# Patient Record
Sex: MALE | Race: WHITE | NOT HISPANIC OR LATINO
[De-identification: names, ages, dates, MRNs, and addresses within clinical notes are randomized per-mention and may not be internally consistent; named-entity substitution may affect disease eponyms.]

---

## 2021-03-14 ENCOUNTER — TRANSCRIPTION ENCOUNTER (OUTPATIENT)
Age: 31
End: 2021-03-14

## 2021-03-23 ENCOUNTER — APPOINTMENT (OUTPATIENT)
Dept: INTERNAL MEDICINE | Facility: CLINIC | Age: 31
End: 2021-03-23

## 2021-04-01 ENCOUNTER — NON-APPOINTMENT (OUTPATIENT)
Age: 31
End: 2021-04-01

## 2021-04-01 ENCOUNTER — APPOINTMENT (OUTPATIENT)
Dept: RHEUMATOLOGY | Facility: CLINIC | Age: 31
End: 2021-04-01
Payer: MEDICAID

## 2021-04-01 VITALS
HEIGHT: 74 IN | SYSTOLIC BLOOD PRESSURE: 116 MMHG | BODY MASS INDEX: 24.77 KG/M2 | DIASTOLIC BLOOD PRESSURE: 70 MMHG | WEIGHT: 193 LBS

## 2021-04-01 DIAGNOSIS — Z82.49 FAMILY HISTORY OF ISCHEMIC HEART DISEASE AND OTHER DISEASES OF THE CIRCULATORY SYSTEM: ICD-10-CM

## 2021-04-01 PROCEDURE — 99205 OFFICE O/P NEW HI 60 MIN: CPT

## 2021-04-01 PROCEDURE — 99072 ADDL SUPL MATRL&STAF TM PHE: CPT

## 2021-04-07 ENCOUNTER — MED ADMIN CHARGE (OUTPATIENT)
Age: 31
End: 2021-04-07

## 2021-04-07 DIAGNOSIS — Z11.1 ENCOUNTER FOR SCREENING FOR RESPIRATORY TUBERCULOSIS: ICD-10-CM

## 2021-04-08 LAB
A PHAGO GROEL BLD QL NAA+NON-PROBE: NEGATIVE
ALBUMIN SERPL ELPH-MCNC: 3.7 G/DL
ALP BLD-CCNC: 105 U/L
ALT SERPL-CCNC: 30 U/L
ANION GAP SERPL CALC-SCNC: 13 MMOL/L
ASO AB SER LA-ACNC: 374 IU/ML
AST SERPL-CCNC: 19 U/L
B BURGDOR AB SER-IMP: NEGATIVE
B BURGDOR DNA SPEC QL NAA+PROBE: NEGATIVE
B BURGDOR IGM PATRN SER IB-IMP: NEGATIVE
B BURGDOR18KD IGG SER QL IB: NORMAL
B BURGDOR23KD IGG SER QL IB: NORMAL
B BURGDOR23KD IGM SER QL IB: NORMAL
B BURGDOR28KD IGG SER QL IB: NORMAL
B BURGDOR30KD IGG SER QL IB: NORMAL
B BURGDOR31KD IGG SER QL IB: NORMAL
B BURGDOR39KD IGG SER QL IB: NORMAL
B BURGDOR39KD IGM SER QL IB: NORMAL
B BURGDOR41KD IGG SER QL IB: NORMAL
B BURGDOR41KD IGM SER QL IB: NORMAL
B BURGDOR45KD IGG SER QL IB: NORMAL
B BURGDOR58KD IGG SER QL IB: NORMAL
B BURGDOR66KD IGG SER QL IB: NORMAL
B BURGDOR93KD IGG SER QL IB: NORMAL
BACTERIA BLD CULT: NORMAL
BASOPHILS # BLD AUTO: 0.06 K/UL
BASOPHILS NFR BLD AUTO: 0.3 %
BILIRUB SERPL-MCNC: 0.4 MG/DL
BUN SERPL-MCNC: 9 MG/DL
CALCIUM SERPL-MCNC: 9 MG/DL
CHLORIDE SERPL-SCNC: 96 MMOL/L
CO2 SERPL-SCNC: 27 MMOL/L
COVID-19 SPIKE DOMAIN ANTIBODY INTERPRETATION: NEGATIVE
CREAT SERPL-MCNC: 0.78 MG/DL
CRP SERPL-MCNC: 129 MG/L
CRYOGLOB SERPL-MCNC: NEGATIVE
E CANIS+EWIN GROEL BLD QL NAA+NON-PROBE: NEGATIVE
E CHAFF GROEL BLD QL NAA+NON-PROBE: NEGATIVE
E MURIS EAUCL GROEL BLD QL NAA+NON-PRB: NEGATIVE
EOSINOPHIL # BLD AUTO: 0.21 K/UL
EOSINOPHIL NFR BLD AUTO: 1.1 %
ERYTHROCYTE [SEDIMENTATION RATE] IN BLOOD BY WESTERGREN METHOD: 107 MM/HR
FERRITIN SERPL-MCNC: 1061 NG/ML
GLUCOSE SERPL-MCNC: 99 MG/DL
HCT VFR BLD CALC: 40.4 %
HGB BLD-MCNC: 12.9 G/DL
IMM GRANULOCYTES NFR BLD AUTO: 0.6 %
LYMPHOCYTES # BLD AUTO: 2.03 K/UL
LYMPHOCYTES NFR BLD AUTO: 10.8 %
M TB IFN-G BLD-IMP: ABNORMAL
MAN DIFF?: NORMAL
MCHC RBC-ENTMCNC: 30.3 PG
MCHC RBC-ENTMCNC: 31.9 GM/DL
MCV RBC AUTO: 94.8 FL
MONOCYTES # BLD AUTO: 0.61 K/UL
MONOCYTES NFR BLD AUTO: 3.2 %
NEUTROPHILS # BLD AUTO: 15.78 K/UL
NEUTROPHILS NFR BLD AUTO: 84 %
PLATELET # BLD AUTO: 541 K/UL
POTASSIUM SERPL-SCNC: 4.5 MMOL/L
PROT SERPL-MCNC: 6.9 G/DL
QUANTIFERON TB PLUS MITOGEN MINUS NIL: 0.1 IU/ML
QUANTIFERON TB PLUS NIL: 0.02 IU/ML
QUANTIFERON TB PLUS TB1 MINUS NIL: -0.01 IU/ML
QUANTIFERON TB PLUS TB2 MINUS NIL: -0.01 IU/ML
RBC # BLD: 4.26 M/UL
RBC # FLD: 13.2 %
SARS-COV-2 AB SERPL IA-ACNC: <3.8 AU/ML
SODIUM SERPL-SCNC: 137 MMOL/L
TSH SERPL-ACNC: 3.35 UIU/ML
WBC # FLD AUTO: 18.8 K/UL

## 2021-04-11 ENCOUNTER — RESULT REVIEW (OUTPATIENT)
Age: 31
End: 2021-04-11

## 2021-04-12 ENCOUNTER — APPOINTMENT (OUTPATIENT)
Dept: GASTROENTEROLOGY | Facility: HOSPITAL | Age: 31
End: 2021-04-12

## 2021-04-12 NOTE — HISTORY OF PRESENT ILLNESS
[FreeTextEntry1] : 30 yo female seen as consultation in hospital for fever of unknown origin.  He was discharged last week with a diagnosis of Lyme Disease, and treated with Doxycycline.  He saw Dr. Casey for a f/u earlier this week, who advised him to discontinue Doxy.\par \par He continues to have fevers daily, with a highest temp of 103.  Most of the time his fever comes down with Tylenol.\par He also has right hip pain (points to the side of his hip) for 3 days.  No trauma.  He has been lying in bed all day.  No pain at rest, but it starts as soon as he starts moving.  \par \par He quit smoking and now his voice is more hoarse.  No change in taste.  He has decreased PO intake due to decreased appetite.  No more weight loss.  + night sweats (drenches clothes)\par + diffuse weakness.  No limb claudication.\par \par He is trying to stay hydrated.\par \par No other sick contacts.\par \par Normal BM.  + productive cough (gray/greenish color).  No blood in sputum.\par No nasal congestion.\par + right sided sore throat.  Sometimes it hurts to drink or eat.  Halls cough drops sometimes helps.\par \par No rashes.  When his fever spikes, his joints get stiff (wrists, ankles, shoulders).  This resolves when fever goes down.\par Muscle pains have completely resolved.

## 2021-04-12 NOTE — DATA REVIEWED
[FreeTextEntry1] : WBC 15, H/H 11.8/34.4, plt 426, CMP normal, ESR 97, .1, ferritin 696, SPEP weak gamma migrating paraprotein, procalcitonin 0.22, ACE nml, Ig normal, RF and anti-CCP negative, , ANCA begative, ROXY neg, Sjogren's Ab negative, JERRELL negative, anti-dsDNA neg, anti-RNPpolymerase 3 Ab negative, anti-chromatin neg, c3, c4 and CH50 negative, lyme IgG all bands positive, IgM pending\par \par CT chest/abd/pelvis normal

## 2021-04-12 NOTE — ASSESSMENT
[FreeTextEntry1] : 32 yo male with no PMH with fever of unknown origin.  Infectious and Rheumatologic work-up so far is negative.  Will complete work-up.  \par Recommend Hematology evaluation of paraproteinemia. Other differential includes vasculitis or Still's disease.  If lab workup is unrevealing, will check MRA chest/abd to evaluate for vasculitis.  In terms of Still's disease, aside from leukocytosis, he does not meet criteria.  Ferritin is usually higher, and there is no hepatosplenomegaly, rashes or abdominal pain.

## 2021-04-13 ENCOUNTER — RESULT REVIEW (OUTPATIENT)
Age: 31
End: 2021-04-13

## 2021-04-13 ENCOUNTER — APPOINTMENT (OUTPATIENT)
Dept: HEMATOLOGY ONCOLOGY | Facility: CLINIC | Age: 31
End: 2021-04-13
Payer: MEDICAID

## 2021-04-13 VITALS
HEIGHT: 73.43 IN | RESPIRATION RATE: 16 BRPM | DIASTOLIC BLOOD PRESSURE: 66 MMHG | OXYGEN SATURATION: 96 % | WEIGHT: 187.99 LBS | SYSTOLIC BLOOD PRESSURE: 101 MMHG | TEMPERATURE: 99.8 F | BODY MASS INDEX: 24.38 KG/M2 | HEART RATE: 90 BPM

## 2021-04-13 DIAGNOSIS — Z80.0 FAMILY HISTORY OF MALIGNANT NEOPLASM OF DIGESTIVE ORGANS: ICD-10-CM

## 2021-04-13 DIAGNOSIS — R79.1 ABNORMAL COAGULATION PROFILE: ICD-10-CM

## 2021-04-13 DIAGNOSIS — Z86.79 PERSONAL HISTORY OF OTHER DISEASES OF THE CIRCULATORY SYSTEM: ICD-10-CM

## 2021-04-13 PROCEDURE — 99215 OFFICE O/P EST HI 40 MIN: CPT

## 2021-04-13 PROCEDURE — 99072 ADDL SUPL MATRL&STAF TM PHE: CPT

## 2021-04-13 NOTE — ASSESSMENT
[FreeTextEntry1] : 31 male with h/o FUO starting March 4, 2021 associated with myalgias, fatigue, weight loss, period of sore throat, abdominal pain- resolved by now.  \par Patient feels slightly better, no fever reported x 24 hr, last fever in the hospital, declining slowly inflammatory markers.\par \par Initial testing revealed trace of monoclonal lambda chains, repeated was WNL.\par \par Flow cytometry pending.\par Factor VII and mixing studies ordered.\par CMV IgM low- positive, possible CMV infection, pending PCR.\par \par \par

## 2021-04-13 NOTE — HISTORY OF PRESENT ILLNESS
[de-identified] : 32 yo male presents today for HFU for FUO, weight loss and leukocytosis.  Patient developed starting March 4, 2021 myalgias, malaise, sore throat, loose BM and significant fatigue.  The fever was initially lower with relapsing pattern, increased in the 3rd week of March and seems to defervesce now sore throat was only one one side and subsided by now. The loose BM appears to be less but patient continues to loose weight.  He denies pain including HA, but developed in the last few days odynophagia.  He denies rash, joint swelling, cough. He was evaluate by ID and rheum and suspected to have Stills disease.  He is s/p EGD yesterday- Tmax 100.8 2-3 days ago.  \par \par WBC- 18.80\par ANC- 15\par HGB- 12.9\par PLT- 541\par ESR- 107\par CRP- 129\par Ferritin- 1061\par Weak IgG Lambda Band Identified while in hospital (March 2021) Repeat April 11, 2021- No Monoclonal Band Identified\par IgM- 281\par KLC- 2.60\par LLC- 2.71\par Ratio- WNL \par Flow- Pending\par  [de-identified] : Patient discharged from hospital - EGD unremarkable, biopsy performed.  No fever last night

## 2021-04-21 ENCOUNTER — LABORATORY RESULT (OUTPATIENT)
Age: 31
End: 2021-04-21

## 2021-04-21 ENCOUNTER — APPOINTMENT (OUTPATIENT)
Dept: INTERNAL MEDICINE | Facility: CLINIC | Age: 31
End: 2021-04-21
Payer: MEDICAID

## 2021-04-21 ENCOUNTER — NON-APPOINTMENT (OUTPATIENT)
Age: 31
End: 2021-04-21

## 2021-04-21 VITALS
BODY MASS INDEX: 24 KG/M2 | HEIGHT: 73.5 IN | WEIGHT: 185 LBS | DIASTOLIC BLOOD PRESSURE: 60 MMHG | SYSTOLIC BLOOD PRESSURE: 100 MMHG

## 2021-04-21 PROCEDURE — 99213 OFFICE O/P EST LOW 20 MIN: CPT | Mod: 25

## 2021-04-21 PROCEDURE — 99072 ADDL SUPL MATRL&STAF TM PHE: CPT

## 2021-04-21 NOTE — ASSESSMENT
[FreeTextEntry1] : fever of unknown origin, probably related to resolving viral infection combined with environmental exposure to a homologus. antibody. Provokinga significant immune response. the anticardiolipin antibodies may be elevated in resolving significant bacterial viral infection. Repeat ESR and CRP we'll give some indication of a rate of resolution of this process.

## 2021-04-21 NOTE — HISTORY OF PRESENT ILLNESS
[FreeTextEntry8] : patient is a 31-year-old male presenting after having been hospitalized sometime ago for fUO e had been sustaining temperatures on a nightly basis of 101-103, and drenching sweats. He was admitted to the hospital, had an extensive workup, including total body MRIs CAT scans andastronomical amounts of blood work, all to no avail. He was however, given 2 courses of antibiotics, whichhave made the diagnosis of subacute or chronic endocarditis At this time extremely difficult. He has however, no major symptomatology associated with chronic area of subacute endocarditis. He has not had any hematuria. He has no splinter hemorrhages. No adenopathy. No enlarged spleen, or liver.His blood work was largely negative save for a hypergammaglobulinemia, and positive and T. cardiolipin antibodies.Both of which can be recuperative phase reactants in her recovery phase of either a viral or bacterial infection. His white counts were elevated His A1c and CRP were astronomically elevated. He's had no significant joint pain or rash.

## 2021-04-22 LAB
ALBUMIN SERPL ELPH-MCNC: 3.7 G/DL
ALP BLD-CCNC: 84 U/L
ALT SERPL-CCNC: 19 U/L
ANION GAP SERPL CALC-SCNC: 14 MMOL/L
AST SERPL-CCNC: 14 U/L
BASOPHILS # BLD AUTO: 0.06 K/UL
BASOPHILS NFR BLD AUTO: 0.3 %
BILIRUB SERPL-MCNC: 0.3 MG/DL
BUN SERPL-MCNC: 5 MG/DL
CALCIUM SERPL-MCNC: 8.7 MG/DL
CHLORIDE SERPL-SCNC: 96 MMOL/L
CO2 SERPL-SCNC: 23 MMOL/L
CREAT SERPL-MCNC: 0.58 MG/DL
CRP SERPL-MCNC: 67 MG/L
EOSINOPHIL # BLD AUTO: 0.22 K/UL
EOSINOPHIL NFR BLD AUTO: 1 %
ERYTHROCYTE [SEDIMENTATION RATE] IN BLOOD BY WESTERGREN METHOD: 85 MM/HR
GLUCOSE SERPL-MCNC: 106 MG/DL
HCT VFR BLD CALC: 36.8 %
HGB BLD-MCNC: 11.6 G/DL
IMM GRANULOCYTES NFR BLD AUTO: 0.5 %
LYMPHOCYTES # BLD AUTO: 2.9 K/UL
LYMPHOCYTES NFR BLD AUTO: 13.1 %
MAN DIFF?: NORMAL
MCHC RBC-ENTMCNC: 29.5 PG
MCHC RBC-ENTMCNC: 31.5 GM/DL
MCV RBC AUTO: 93.6 FL
MONOCYTES # BLD AUTO: 0.68 K/UL
MONOCYTES NFR BLD AUTO: 3.1 %
NEUTROPHILS # BLD AUTO: 18.25 K/UL
NEUTROPHILS NFR BLD AUTO: 82 %
PLATELET # BLD AUTO: 474 K/UL
POTASSIUM SERPL-SCNC: 4.3 MMOL/L
PROT SERPL-MCNC: 6.9 G/DL
RBC # BLD: 3.93 M/UL
RBC # FLD: 13.7 %
RHEUMATOID FACT SER QL: <10 IU/ML
SODIUM SERPL-SCNC: 133 MMOL/L
T PALLIDUM AB SER QL IA: NEGATIVE
WBC # FLD AUTO: 22.22 K/UL

## 2021-04-27 LAB — DEPRECATED CARDIOLIPIN IGA SER: <5 APL

## 2021-04-28 LAB
ALBUMIN MFR SERPL ELPH: 46.1 %
ALBUMIN SERPL-MCNC: 3.2 G/DL
ALBUMIN/GLOB SERPL: 0.9 RATIO
ALPHA1 GLOB MFR SERPL ELPH: 8.2 %
ALPHA1 GLOB SERPL ELPH-MCNC: 0.6 G/DL
ALPHA2 GLOB MFR SERPL ELPH: 15.5 %
ALPHA2 GLOB SERPL ELPH-MCNC: 1.1 G/DL
B-GLOBULIN MFR SERPL ELPH: 12.4 %
B-GLOBULIN SERPL ELPH-MCNC: 0.9 G/DL
GAMMA GLOB FLD ELPH-MCNC: 1.2 G/DL
GAMMA GLOB MFR SERPL ELPH: 17.8 %
INTERPRETATION SERPL IEP-IMP: NORMAL
PROT SERPL-MCNC: 6.9 G/DL
PROT SERPL-MCNC: 6.9 G/DL

## 2021-05-04 ENCOUNTER — RESULT REVIEW (OUTPATIENT)
Age: 31
End: 2021-05-04

## 2021-05-04 ENCOUNTER — APPOINTMENT (OUTPATIENT)
Dept: HEMATOLOGY ONCOLOGY | Facility: CLINIC | Age: 31
End: 2021-05-04
Payer: MEDICAID

## 2021-05-04 VITALS
OXYGEN SATURATION: 98 % | WEIGHT: 184 LBS | HEIGHT: 73.5 IN | TEMPERATURE: 98.6 F | HEART RATE: 79 BPM | DIASTOLIC BLOOD PRESSURE: 71 MMHG | BODY MASS INDEX: 23.87 KG/M2 | RESPIRATION RATE: 18 BRPM | SYSTOLIC BLOOD PRESSURE: 114 MMHG

## 2021-05-04 DIAGNOSIS — R50.9 FEVER, UNSPECIFIED: ICD-10-CM

## 2021-05-04 DIAGNOSIS — A68.9 RELAPSING FEVER, UNSPECIFIED: ICD-10-CM

## 2021-05-04 DIAGNOSIS — R63.4 ABNORMAL WEIGHT LOSS: ICD-10-CM

## 2021-05-04 DIAGNOSIS — R79.89 OTHER SPECIFIED ABNORMAL FINDINGS OF BLOOD CHEMISTRY: ICD-10-CM

## 2021-05-04 PROCEDURE — 99072 ADDL SUPL MATRL&STAF TM PHE: CPT

## 2021-05-04 PROCEDURE — 99215 OFFICE O/P EST HI 40 MIN: CPT

## 2021-05-04 NOTE — HISTORY OF PRESENT ILLNESS
[de-identified] : 30 yo male presents today for HFU for FUO, weight loss and leukocytosis.  Patient developed starting March 4, 2021 myalgias, malaise, sore throat, loose BM and significant fatigue.  The fever was initially lower with relapsing pattern, increased in the 3rd week of March and seems to defervesce now sore throat was only one one side and subsided by now. The loose BM appears to be less but patient continues to loose weight.  He denies pain including HA, but developed in the last few days odynophagia.  He denies rash, joint swelling, cough. He was evaluate by ID and rheum and suspected to have Stills disease.  He is s/p EGD yesterday- Tmax 100.8 2-3 days ago.  \par \par WBC- 18.80\par ANC- 15\par HGB- 12.9\par PLT- 541\par ESR- 107\par CRP- 129\par Ferritin- 1061\par Weak IgG Lambda Band Identified while in hospital (March 2021) Repeat April 11, 2021- No Monoclonal Band Identified\par IgM- 281\par KLC- 2.60\par LLC- 2.71\par Ratio- WNL \par Flow- Pending\par  [de-identified] : Patient discharged from hospital - EGD unremarkable, biopsy performed.  No fever last night

## 2021-05-04 NOTE — ASSESSMENT
[FreeTextEntry1] : 31 male with h/o FUO starting March 4, 2021 associated with myalgias, fatigue, weight loss, period of sore throat, abdominal pain- resolved by now.  \par Patient feels slightly better, no fever reported x 24 hr, last fever in the hospital, declining slowly inflammatory markers.\par \par Initial testing revealed trace of monoclonal lambda chains, repeated was WNL.\par \par Flow cytometry pending.\par Factor VII and mixing studies ordered.\par CMV IgM low- positive, possible CMV infection, pending PCR.\par \par 5/4/2021\par pt feeling better\par fevers mainly subsided, some weight loss \par still has pain in bilateral hips, left greater than right worse with ambulation\par FLOW negative\par anticardiolipin antibodies positive- most likely secondary to inflammatory process- repeat in 2 months \par WBC, ESR, and CRP improving \par \par case and mgmt discussed with Dr. Dotson- pt to return in 2 months or sooner if symptoms develop and or worsen  cbc, chem, anticardiolipin antibodies\par

## 2021-07-13 ENCOUNTER — RESULT REVIEW (OUTPATIENT)
Age: 31
End: 2021-07-13

## 2021-07-13 ENCOUNTER — APPOINTMENT (OUTPATIENT)
Dept: HEMATOLOGY ONCOLOGY | Facility: CLINIC | Age: 31
End: 2021-07-13
Payer: MEDICAID

## 2021-07-13 VITALS
BODY MASS INDEX: 24.26 KG/M2 | DIASTOLIC BLOOD PRESSURE: 61 MMHG | RESPIRATION RATE: 16 BRPM | SYSTOLIC BLOOD PRESSURE: 97 MMHG | HEIGHT: 73.5 IN | HEART RATE: 73 BPM | TEMPERATURE: 97.1 F | WEIGHT: 187 LBS | OXYGEN SATURATION: 96 %

## 2021-07-13 DIAGNOSIS — M25.559 PAIN IN UNSPECIFIED HIP: ICD-10-CM

## 2021-07-13 PROCEDURE — 99213 OFFICE O/P EST LOW 20 MIN: CPT

## 2021-07-13 RX ORDER — PANTOPRAZOLE 40 MG/1
TABLET, DELAYED RELEASE ORAL
Refills: 0 | Status: COMPLETED | COMMUNITY
End: 2021-07-13

## 2021-07-13 NOTE — ASSESSMENT
[FreeTextEntry1] : 31 male with h/o FUO starting March 4, 2021 associated with myalgias, fatigue, weight loss, period of sore throat, abdominal pain- resolved by now.  \par Patient feels slightly better, no fever reported x 24 hr, last fever in the hospital, declining slowly inflammatory markers.\par \par Initial testing revealed trace of monoclonal lambda chains, repeated was WNL.\par \par Flow cytometry pending.\par Factor VII and mixing studies ordered.\par CMV IgM low- positive, possible CMV infection, pending PCR.\par \par 5/4/2021\par pt feeling better\par fevers mainly subsided, some weight loss \par still has pain in bilateral hips, left greater than right worse with ambulation\par FLOW negative\par anticardiolipin antibodies positive- most likely secondary to inflammatory process- repeat in 2 months \par WBC, ESR, and CRP improving \par \par 7/13/2021\par pt feeling well- afebrile, weight increased\par arthralgias greatly improving\par check inflammatory markers and anticardiolipin antibodies today\par \par \par case and mgmt discussed with Dr. Dotson- pt to return in 2 months or sooner if symptoms develop \par

## 2021-07-13 NOTE — HISTORY OF PRESENT ILLNESS
[de-identified] : 32 yo male presents today for HFU for FUO, weight loss and leukocytosis.  Patient developed starting March 4, 2021 myalgias, malaise, sore throat, loose BM and significant fatigue.  The fever was initially lower with relapsing pattern, increased in the 3rd week of March and seems to defervesce now sore throat was only one one side and subsided by now. The loose BM appears to be less but patient continues to loose weight.  He denies pain including HA, but developed in the last few days odynophagia.  He denies rash, joint swelling, cough. He was evaluate by ID and rheum and suspected to have Stills disease.  He is s/p EGD yesterday- Tmax 100.8 2-3 days ago.  \par \par WBC- 18.80\par ANC- 15\par HGB- 12.9\par PLT- 541\par ESR- 107\par CRP- 129\par Ferritin- 1061\par Weak IgG Lambda Band Identified while in hospital (March 2021) Repeat April 11, 2021- No Monoclonal Band Identified\par IgM- 281\par KLC- 2.60\par LLC- 2.71\par Ratio- WNL \par Flow- Pending\par  [de-identified] : Patient discharged from hospital - EGD unremarkable, biopsy performed.  Has remained afebrile- increased weight arthralgias improved

## 2021-07-17 ENCOUNTER — LABORATORY RESULT (OUTPATIENT)
Age: 31
End: 2021-07-17

## 2021-09-21 ENCOUNTER — RESULT REVIEW (OUTPATIENT)
Age: 31
End: 2021-09-21

## 2021-09-21 ENCOUNTER — APPOINTMENT (OUTPATIENT)
Dept: HEMATOLOGY ONCOLOGY | Facility: CLINIC | Age: 31
End: 2021-09-21
Payer: MEDICAID

## 2021-09-21 VITALS
WEIGHT: 191.4 LBS | HEIGHT: 73.5 IN | HEART RATE: 75 BPM | SYSTOLIC BLOOD PRESSURE: 104 MMHG | DIASTOLIC BLOOD PRESSURE: 67 MMHG | TEMPERATURE: 97.6 F | OXYGEN SATURATION: 98 % | RESPIRATION RATE: 16 BRPM | BODY MASS INDEX: 24.83 KG/M2

## 2021-09-21 DIAGNOSIS — R70.0 ELEVATED ERYTHROCYTE SEDIMENTATION RATE: ICD-10-CM

## 2021-09-21 DIAGNOSIS — Z23 ENCOUNTER FOR IMMUNIZATION: ICD-10-CM

## 2021-09-21 DIAGNOSIS — M79.10 MYALGIA, UNSPECIFIED SITE: ICD-10-CM

## 2021-09-21 DIAGNOSIS — R79.82 ELEVATED C-REACTIVE PROTEIN (CRP): ICD-10-CM

## 2021-09-21 PROCEDURE — 99214 OFFICE O/P EST MOD 30 MIN: CPT

## 2021-09-26 PROBLEM — M79.10 MYALGIA: Status: ACTIVE | Noted: 2021-04-01

## 2021-09-26 PROBLEM — R70.0 ELEVATED SED RATE: Status: ACTIVE | Noted: 2021-04-11

## 2021-09-26 NOTE — ASSESSMENT
[FreeTextEntry1] : 31 male with h/o FUO starting March 4, 2021 associated with myalgias, fatigue, weight loss, period of sore throat, abdominal pain- resolved by now.  \par Patient feels slightly better, no fever reported x 24 hr, last fever in the hospital, declining slowly inflammatory markers.\par \par Initial testing revealed trace of monoclonal lambda chains, repeated was WNL.\par \par Flow cytometry pending.\par Factor VII and mixing studies ordered.\par CMV IgM low- positive, possible CMV infection, pending PCR.\par \par 5/4/2021\par pt feeling better\par fevers mainly subsided, some weight loss \par still has pain in bilateral hips, left greater than right worse with ambulation\par FLOW negative\par anticardiolipin antibodies positive- most likely secondary to inflammatory process- repeat in 2 months \par WBC, ESR, and CRP improving \par \par 7/13/2021\par pt feeling well- afebrile, weight increased\par arthralgias greatly improving\par check inflammatory markers and anticardiolipin antibodies today\par \par 9/21//2021\par Feels batter, no flare up since May 2021\par Not vaccinated for covid \par No arthralgia \par RTC 6 months \par repeat inflammatory parameters and multiple myeloma panel \par CBC reviewed today\par \par \par  pt to return in 6 months or sooner if symptoms develop \par

## 2021-09-26 NOTE — HISTORY OF PRESENT ILLNESS
[de-identified] : 32 yo male presents today for HFU for FUO, weight loss and leukocytosis.  Patient developed starting March 4, 2021 myalgias, malaise, sore throat, loose BM and significant fatigue.  The fever was initially lower with relapsing pattern, increased in the 3rd week of March and seems to defervesce now sore throat was only one one side and subsided by now. The loose BM appears to be less but patient continues to loose weight.  He denies pain including HA, but developed in the last few days odynophagia.  He denies rash, joint swelling, cough. He was evaluate by ID and rheum and suspected to have Stills disease.  He is s/p EGD yesterday- Tmax 100.8 2-3 days ago.  \par \par WBC- 18.80\par ANC- 15\par HGB- 12.9\par PLT- 541\par ESR- 107\par CRP- 129\par Ferritin- 1061\par Weak IgG Lambda Band Identified while in hospital (March 2021) Repeat April 11, 2021- No Monoclonal Band Identified\par IgM- 281\par KLC- 2.60\par LLC- 2.71\par Ratio- WNL \par Flow- Pending\par  [de-identified] : Patient discharged from hospital - EGD unremarkable, biopsy performed.  Has remained afebrile- increased weight arthralgias improved - he has been afebrile since July

## 2022-03-18 DIAGNOSIS — D47.2 MONOCLONAL GAMMOPATHY: ICD-10-CM

## 2022-03-21 ENCOUNTER — APPOINTMENT (OUTPATIENT)
Dept: HEMATOLOGY ONCOLOGY | Facility: CLINIC | Age: 32
End: 2022-03-21

## 2022-06-10 ENCOUNTER — APPOINTMENT (OUTPATIENT)
Dept: FAMILY MEDICINE | Facility: CLINIC | Age: 32
End: 2022-06-10
Payer: MEDICAID

## 2022-06-10 ENCOUNTER — NON-APPOINTMENT (OUTPATIENT)
Age: 32
End: 2022-06-10

## 2022-06-10 VITALS
TEMPERATURE: 97.4 F | SYSTOLIC BLOOD PRESSURE: 100 MMHG | HEIGHT: 73.5 IN | OXYGEN SATURATION: 99 % | RESPIRATION RATE: 17 BRPM | DIASTOLIC BLOOD PRESSURE: 52 MMHG | HEART RATE: 55 BPM | BODY MASS INDEX: 26.07 KG/M2 | WEIGHT: 201 LBS

## 2022-06-10 DIAGNOSIS — Z00.00 ENCOUNTER FOR GENERAL ADULT MEDICAL EXAMINATION W/OUT ABNORMAL FINDINGS: ICD-10-CM

## 2022-06-10 DIAGNOSIS — Z02.0 ENCOUNTER FOR EXAMINATION FOR ADMISSION TO EDUCATIONAL INSTITUTION: ICD-10-CM

## 2022-06-10 PROCEDURE — 99395 PREV VISIT EST AGE 18-39: CPT | Mod: 25

## 2022-06-10 PROCEDURE — 99406 BEHAV CHNG SMOKING 3-10 MIN: CPT

## 2022-06-13 PROBLEM — Z02.0 SCHOOL HEALTH EXAMINATION: Status: ACTIVE | Noted: 2022-06-10

## 2022-06-13 PROBLEM — Z00.00 ENCOUNTER FOR PREVENTIVE HEALTH EXAMINATION: Status: ACTIVE | Noted: 2021-03-23

## 2022-06-13 LAB
ALBUMIN SERPL ELPH-MCNC: 4.7 G/DL
ALP BLD-CCNC: 65 U/L
ALT SERPL-CCNC: 16 U/L
ANION GAP SERPL CALC-SCNC: 13 MMOL/L
AST SERPL-CCNC: 15 U/L
BASOPHILS # BLD AUTO: 0.06 K/UL
BASOPHILS NFR BLD AUTO: 0.8 %
BILIRUB SERPL-MCNC: 0.7 MG/DL
BUN SERPL-MCNC: 10 MG/DL
CALCIUM SERPL-MCNC: 9.6 MG/DL
CHLORIDE SERPL-SCNC: 102 MMOL/L
CHOLEST SERPL-MCNC: 203 MG/DL
CO2 SERPL-SCNC: 25 MMOL/L
CREAT SERPL-MCNC: 0.84 MG/DL
EGFR: 119 ML/MIN/1.73M2
EOSINOPHIL # BLD AUTO: 0.29 K/UL
EOSINOPHIL NFR BLD AUTO: 3.7 %
ESTIMATED AVERAGE GLUCOSE: 105 MG/DL
GLUCOSE SERPL-MCNC: 91 MG/DL
HBA1C MFR BLD HPLC: 5.3 %
HBV CORE IGG+IGM SER QL: NONREACTIVE
HBV SURFACE AB SER QL: REACTIVE
HBV SURFACE AG SER QL: NONREACTIVE
HCT VFR BLD CALC: 44.1 %
HCV AB SER QL: NONREACTIVE
HCV S/CO RATIO: 0.17 S/CO
HDLC SERPL-MCNC: 30 MG/DL
HEPATITIS A IGG ANTIBODY: REACTIVE
HGB BLD-MCNC: 15.3 G/DL
HIV1+2 AB SPEC QL IA.RAPID: NONREACTIVE
IMM GRANULOCYTES NFR BLD AUTO: 0.3 %
LDLC SERPL CALC-MCNC: 150 MG/DL
LYMPHOCYTES # BLD AUTO: 2.79 K/UL
LYMPHOCYTES NFR BLD AUTO: 35.4 %
MAN DIFF?: NORMAL
MCHC RBC-ENTMCNC: 31.7 PG
MCHC RBC-ENTMCNC: 34.7 GM/DL
MCV RBC AUTO: 91.3 FL
MEV IGG FLD QL IA: 124 AU/ML
MEV IGG+IGM SER-IMP: POSITIVE
MONOCYTES # BLD AUTO: 0.59 K/UL
MONOCYTES NFR BLD AUTO: 7.5 %
MUV AB SER-ACNC: POSITIVE
MUV IGG SER QL IA: 20.9 AU/ML
NEUTROPHILS # BLD AUTO: 4.14 K/UL
NEUTROPHILS NFR BLD AUTO: 52.3 %
NONHDLC SERPL-MCNC: 173 MG/DL
PLATELET # BLD AUTO: 244 K/UL
POTASSIUM SERPL-SCNC: 3.9 MMOL/L
PROT SERPL-MCNC: 7 G/DL
RBC # BLD: 4.83 M/UL
RBC # FLD: 13.3 %
RUBV IGG FLD-ACNC: 1.1 INDEX
RUBV IGG SER-IMP: POSITIVE
SODIUM SERPL-SCNC: 140 MMOL/L
TRIGL SERPL-MCNC: 111 MG/DL
TSH SERPL-ACNC: 2.08 UIU/ML
VZV AB TITR SER: POSITIVE
VZV IGG SER IF-ACNC: 506 INDEX
WBC # FLD AUTO: 7.89 K/UL

## 2022-06-13 NOTE — HEALTH RISK ASSESSMENT
[Good] : ~his/her~ current health as good [Fair] :  ~his/her~ mood as fair [Current] : Current [Yes] : Yes [Monthly or less (1 pt)] : Monthly or less (1 point) [1 or 2 (0 pts)] : 1 or 2 (0 points) [Never (0 pts)] : Never (0 points) [No] : In the past 12 months have you used drugs other than those required for medical reasons? No [No falls in past year] : Patient reported no falls in the past year [0] : 2) Feeling down, depressed, or hopeless: Not at all (0) [PHQ-2 Negative - No further assessment needed] : PHQ-2 Negative - No further assessment needed [HIV Test offered] : HIV Test offered [Hepatitis C test offered] : Hepatitis C test offered [Behavior] : difficulty with behavior [None] : None [With Family] : lives with family [Employed] : employed [Student] : student [Significant Other] : lives with significant other [# Of Children ___] : has [unfilled] children [Sexually Active] : sexually active [Feels Safe at Home] : Feels safe at home [Fully functional (bathing, dressing, toileting, transferring, walking, feeding)] : Fully functional (bathing, dressing, toileting, transferring, walking, feeding) [Fully functional (using the telephone, shopping, preparing meals, housekeeping, doing laundry, using] : Fully functional and needs no help or supervision to perform IADLs (using the telephone, shopping, preparing meals, housekeeping, doing laundry, using transportation, managing medications and managing finances) [Smoke Detector] : smoke detector [Carbon Monoxide Detector] : carbon monoxide detector [Safety elements used in home] : safety elements used in home [Seat Belt] :  uses seat belt [Sunscreen] : uses sunscreen [# of Members in Household ___] :  household currently consist of [unfilled] member(s) [College] : College [de-identified] : 7 cigarettes a day  [de-identified] : one beer a week  [Audit-CScore] : 1 [de-identified] : Marijuana - every other day  [de-identified] : walks; yard work  [de-identified] : skips meal not well balanced diet  [DMA3Joepi] : 0 [Change in mental status noted] : No change in mental status noted [Language] : denies difficulty with language [Learning/Retaining New Information] : denies difficulty learning/retaining new information [Reasoning] : denies difficulty with reasoning [Spatial Ability and Orientation] : denies difficulty with spatial ability and orientation [Reports changes in hearing] : Reports no changes in hearing [Reports changes in vision] : Reports no changes in vision [Reports changes in dental health] : Reports no changes in dental health [Guns at Home] : no guns at home [TB Exposure] : is not being exposed to tuberculosis [MammogramComments] : n/a [PapSmearComments] : n/a [BoneDensityComments] : n/a [ColonoscopyComments] : n/a [de-identified] : Being grumpy or upset due cutting down on smoking [de-identified] : fiance and child [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

## 2022-06-13 NOTE — COUNSELING
[Yes] : Risk of tobacco use and health benefits of smoking cessation discussed: Yes [Cessation strategies including cessation program discussed] : Cessation strategies including cessation program discussed [Use of nicotine replacement therapies and other medications discussed] : Use of nicotine replacement therapies and other medications discussed [No] : Not willing to quit smoking [FreeTextEntry1] : 5